# Patient Record
(demographics unavailable — no encounter records)

---

## 2025-01-02 NOTE — ASSESSMENT
[Carbohydrate Consistent Diet] : carbohydrate consistent diet [Importance of Diet and Exercise] : importance of diet and exercise to improve glycemic control, achieve weight loss and improve cardiovascular health [Self Monitoring of Blood Glucose] : self monitoring of blood glucose [Weight Loss] : weight loss [Diabetic Medications] : Risks and benefits of diabetic medications were discussed [Hypoglycemia Management] : hypoglycemia management

## 2025-01-02 NOTE — PHYSICAL EXAM
[No Acute Distress] : no acute distress [Normal Sclera/Conjunctiva] : normal sclera/conjunctiva [Normal Oropharynx] : the oropharynx was normal [Thyroid Not Enlarged] : the thyroid was not enlarged [No Thyroid Nodules] : no palpable thyroid nodules [Clear to Auscultation] : lungs were clear to auscultation bilaterally [Normal Rate] : heart rate was normal [No Edema] : no peripheral edema [Pedal Pulses Normal] : the pedal pulses are present [Soft] : abdomen soft [Spine Straight] : spine straight [No Stigmata of Cushings Syndrome] : no stigmata of Cushings Syndrome [Normal Gait] : normal gait [Normal Strength/Tone] : muscle strength and tone were normal [Right foot was examined, including] : right foot ~C was examined, including visual inspection with sensory and pulse exams [Left foot was examined, including] : left foot ~C was examined, including visual inspection with sensory and pulse exams [Normal Reflexes] : deep tendon reflexes were 2+ and symmetric [No Tremors] : no tremors [Oriented x3] : oriented to person, place, and time [Kyphosis] : no kyphosis present [Acanthosis Nigricans] : no acanthosis nigricans [Vibration Dec.] : normal vibratory sensation at the level of the toes [de-identified] : DM Dermopathy B/L. Dry skin. Fungal nails of great toe, right more than left.

## 2025-01-02 NOTE — PHYSICAL EXAM
[No Acute Distress] : no acute distress [Normal Sclera/Conjunctiva] : normal sclera/conjunctiva [Normal Oropharynx] : the oropharynx was normal [Thyroid Not Enlarged] : the thyroid was not enlarged [No Thyroid Nodules] : no palpable thyroid nodules [Clear to Auscultation] : lungs were clear to auscultation bilaterally [Normal Rate] : heart rate was normal [No Edema] : no peripheral edema [Pedal Pulses Normal] : the pedal pulses are present [Soft] : abdomen soft [Spine Straight] : spine straight [No Stigmata of Cushings Syndrome] : no stigmata of Cushings Syndrome [Normal Gait] : normal gait [Normal Strength/Tone] : muscle strength and tone were normal [Right foot was examined, including] : right foot ~C was examined, including visual inspection with sensory and pulse exams [Left foot was examined, including] : left foot ~C was examined, including visual inspection with sensory and pulse exams [Normal Reflexes] : deep tendon reflexes were 2+ and symmetric [No Tremors] : no tremors [Oriented x3] : oriented to person, place, and time [Kyphosis] : no kyphosis present [Acanthosis Nigricans] : no acanthosis nigricans [Vibration Dec.] : normal vibratory sensation at the level of the toes [de-identified] : DM Dermopathy B/L. Dry skin. Fungal nails of great toe, right more than left.

## 2025-01-02 NOTE — CONSULT LETTER
[Dear  ___] : Dear  [unfilled], [Consult Letter:] : I had the pleasure of evaluating your patient, [unfilled]. [Sincerely,] : Sincerely, [FreeTextEntry3] : Krishna Woods

## 2025-01-02 NOTE — REASON FOR VISIT
[Follow - Up] : a follow-up visit [DM Type 2] : DM Type 2 [Spouse] : spouse [FreeTextEntry2] : Sebastian Otero MD,

## 2025-01-02 NOTE — HISTORY OF PRESENT ILLNESS
[FreeTextEntry1] : 52 year old M pt, with Hx of T2DM (dx. in 2014), referred by Sebastian Otero MD, FAX [phone], presents today to establish endocrine care. Other PMHx: None PSHx: None FHx: Mother: DM, Father: Siblings: 1 brother and 3 sisters with DM SHx: No EtOH, no smoking, 3-4 cups of coffee daily. NKDA Vaccinated for COVID, flu, but not pneumonia.    08/22/2024 Pt presents today accompanied by his wife. CC: "I recently followed up with my PCP, Dr. Sebastian Shipley, and my sugar was out of control, so he referred me for an evaluation." Pt was initially hospitalized after he was developed herpes and was dx with DM in the hospital ~10 years ago. He was started on Metformin and had taken it until it was changed to Janumet 2 years ago. He was also started on Trulicity ~6 months ago. Pt generally does not check glucose levels at home.  Pt followed up with a nurse educator in [place] a few months ago, his ophthalmologist in the beginning of this year, and his dentist many years ago. He has never visited an RD.  Pt reports occasional general weakness and fatigue. Pt endorses nocturia, blurred vision, and pins and needles in LE.  08/29/2024  Pt has POCT 188, /74 and BMI 31.31. No significant weight change. CC: "I already noticed changes in my glucose readings. I'm feeling better and changing my lifestyle. Pt reports FBS from 120-160. PPBS 130s-180s. Lab reports reviewed with pt.  - 08/22/24 A1c 8.3%, C-peptide 7.2, glucose 299, LDL-c 79, ACR 22   12/31/2024  Pt has POCT 225, /64 and BMI 29.38. He lost 12 lbs in 4 months.  CC: "I'm doing good, feeling well." Pt reports that his sugars are very well controlled. However, he states that his "digestion is slow" with frequent burping and ease of satiety. As a result, he discontinued Mounjaro for the past 2 weeks and his stomach symptoms have improved. He underwent EGD on 12/19/24 with Impression: esophagitis and "food retention".  Last meal was 3 pm yesterday. - 12/31/2024 A1c 6.1%  [Medications verified as per pt on 12/31/2024] Current Medications: Janumet  mg bid, Glimepiride 1 mg qd (From 2 mg), Sertraline 100 mg qd, Losartan 25 mg qd, Atorvastatin 20 mg qd HELD: Trulicity 3 mg qw, Zepbound 10 mg qw, Mounjaro 10 mg qw Medication modified/added this visit: Hold Mounjaro 10 mg qw (due to GI side effects)

## 2025-01-02 NOTE — END OF VISIT
[FreeTextEntry3] :  All medical record entries made by the Scribe were at my, Dr. Krishna Woods, direction and personally dictated by me on 12/31/2024. I have reviewed the chart and agree that the record accurately reflects my personal performance of the history, physical exam, assessment and plan. I have also personally directed, reviewed and agreed with the chart. [Time Spent: ___ minutes] : I have spent [unfilled] minutes of time on the encounter which excludes teaching and separately reported services.

## 2025-01-02 NOTE — HISTORY OF PRESENT ILLNESS
[FreeTextEntry1] : 52 year old M pt, with Hx of T2DM (dx. in 2014), referred by Sebastian Otero MD, FAX [phone], presents today to establish endocrine care. Other PMHx: None PSHx: None FHx: Mother: DM, Father: Siblings: 1 brother and 3 sisters with DM SHx: No EtOH, no smoking, 3-4 cups of coffee daily. NKDA Vaccinated for COVID, flu, but not pneumonia.    08/22/2024 Pt presents today accompanied by his wife. CC: "I recently followed up with my PCP, Dr. Sebastian Shipley, and my sugar was out of control, so he referred me for an evaluation." Pt was initially hospitalized after he was developed herpes and was dx with DM in the hospital ~10 years ago. He was started on Metformin and had taken it until it was changed to Janumet 2 years ago. He was also started on Trulicity ~6 months ago. Pt generally does not check glucose levels at home.  Pt followed up with a nurse educator in [place] a few months ago, his ophthalmologist in the beginning of this year, and his dentist many years ago. He has never visited an RD.  Pt reports occasional general weakness and fatigue. Pt endorses nocturia, blurred vision, and pins and needles in LE.  08/29/2024  Pt has POCT 188, /74 and BMI 31.31. No significant weight change. CC: "I already noticed changes in my glucose readings. I'm feeling better and changing my lifestyle. Pt reports FBS from 120-160. PPBS 130s-180s. Lab reports reviewed with pt.  - 08/22/24 A1c 8.3%, C-peptide 7.2, glucose 299, LDL-c 79, ACR 22   12/31/2024  Pt has POCT 225, /64 and BMI 29.38. He lost 12 lbs in 4 months.  CC: "I'm doing good, feeling well." Pt reports that his sugars are very well controlled. However, he states that his "digestion is slow" with frequent burping and ease of satiety. As a result, he discontinued Mounjaro for the past 2 weeks and his stomach symptoms have improved. He underwent EGD on 12/19/24 with Impression: esophagitis and "food retention".  Last meal was 3 pm yesterday. - 12/31/2024 A1c 6.1%  [Medications verified as per pt on 12/31/2024] Current Medications: Janumet  mg bid, Glimepiride 1 mg qd (From 2 mg), Sertraline 100 mg qd, Losartan 25 mg qd, Atorvastatin 20 mg qd HELD: Trulicity 3 mg qw, Zepbound 10 mg qw, Mounjaro 10 mg qw Medication modified/added this visit: Hold Mounjaro 10 mg qw (due to GI side effects)

## 2025-01-02 NOTE — DATA REVIEWED
[FreeTextEntry1] : Reviewed labs: - 08/22/24 A1c 8.3%, C-peptide 7.2, glucose 299, LDL-c 79, ACR 22

## 2025-01-02 NOTE — ADDENDUM
[FreeTextEntry1] : I, Tushar You act solely as a scribe for Dr. Krishna Woods on this date 12/31/2024